# Patient Record
(demographics unavailable — no encounter records)

---

## 2024-11-04 NOTE — DISCUSSION/SUMMARY
[Medication Risks Reviewed] : Medication risks reviewed [de-identified] : Her pain could be related to an acute compression fracture that is not fully evident at this point or it could be related to aggravation of her chronic back symptoms related to her multilevel degenerative changes.  I discussed with the patient and her daughter that we would normally treat these symptoms with nonsteroidal anti-inflammatory medications but they are contraindicated in light of her compromised renal function.  They could also lead to fluid retention which could aggravate her history of congestive heart failure.  She will rest and use moist heat and push Tylenol to the full dose.  I will see her for follow-up in 3 weeks.

## 2024-11-04 NOTE — HISTORY OF PRESENT ILLNESS
[de-identified] : This 69-year-old woman is seen in the office today along with her daughter who serves as an .  She has a history of chronic intermittent back symptoms.  10 days ago she fell in her kitchen.  She does not know how she fell or landed.  Since then she has had midline lumbosacral junction lower back pain.  She has not had associated leg pain or neurologic symptoms in the lower extremities.  Her chronic constipation has increased.  Her past medical history is positive for compromised renal function.  Creatinine recently was as high as 1.52 and is now 1.30.  She also has a history of congestive heart failure.  She has had a markedly elevated hemoglobin A1c which is now 6.9.  She has been taking oxycodone for the pain. [Pain Location] : pain [8] : a maximum pain level of 8/10

## 2024-11-04 NOTE — PHYSICAL EXAM
[de-identified] : She seems fully alert and oriented with a normal mood and affect.  She has pain transferring from a chair to the examining table.  She ambulates with a reasonably normal gait.  Her lower extremity neurological exam is normal and straight leg raising is negative to 90 degrees bilaterally. [de-identified] : AP and lateral x-rays of the lumbar spine are obtained.  She has multilevel degenerative changes.  Several lumbar vertebra are slightly wedged so there is a possibility of an acute fracture.  There are no destructive changes.

## 2024-11-04 NOTE — REVIEW OF SYSTEMS
[Constipation] : constipation [Negative] : Respiratory [FreeTextEntry5] : History of congestive heart failure [FreeTextEntry8] : Compromised renal function

## 2024-11-25 NOTE — DISCUSSION/SUMMARY
[Medication Risks Reviewed] : Medication risks reviewed [de-identified] : I discussed with the patient through her daughter that even though there is no evidence of any osteoporosis in the lumbar spine with a fall from a standing height and a compression fracture she obviously has osteoporosis.  She needs to be treated for such.  She is already under the care of of an endocrinologist for thyroid problems.  If he does not want to treated she has been given the name of an orthopedic surgeon who specializes in the management of osteoporosis.  I will see her for follow-up in 2 months.

## 2024-11-25 NOTE — REASON FOR VISIT
[Follow-Up Visit] : a follow-up visit for [Back Pain] : back pain [Family Member] : family member [FreeTextEntry2] : Compression fracture L2

## 2024-11-25 NOTE — HISTORY OF PRESENT ILLNESS
[de-identified] : This 69-year-old woman returns for follow-up of spine related symptoms along with her daughter who serves as an .  She was seen 3 weeks ago about 10 days after a fall with the onset of back pain.  X-rays at that point revealed some mild wedging of a few lumbar vertebra with a little buckle on the superior anterior portion of L2 that was suggestive of a fracture.  The pain that was a constant 8 or 9 3 weeks ago is now on on and off 5.  She had a bone density earlier this year that was normal in the lumbar spine with some osteopenia about the hip.

## 2024-11-25 NOTE — PHYSICAL EXAM
[de-identified] : She is comfortable sitting today and straight leg raising is negative to 90 degrees. [de-identified] : I repeated x-rays of the lumbar spine and she clearly has a very mild superior endplate fracture of L2.

## 2025-01-09 NOTE — PHYSICAL EXAM
[Well Nourished] : well nourished [Well Developed] : well developed [Well-Appearing] : well-appearing [Normal Voice/Communication] : normal voice/communication [Normal Outer Ear/Nose] : the outer ears and nose were normal in appearance [Normal Oropharynx] : the oropharynx was normal [Normal TMs] : both tympanic membranes were normal [Normal Nasal Mucosa] : the nasal mucosa was normal [No JVD] : no jugular venous distention [No Lymphadenopathy] : no lymphadenopathy [Supple] : supple [Thyroid Normal, No Nodules] : the thyroid was normal and there were no nodules present [No Respiratory Distress] : no respiratory distress  [No Accessory Muscle Use] : no accessory muscle use [Clear to Auscultation] : lungs were clear to auscultation bilaterally [Normal Rate] : normal rate  [Regular Rhythm] : with a regular rhythm [Normal S1, S2] : normal S1 and S2 [No Murmur] : no murmur heard [No Carotid Bruits] : no carotid bruits [No Abdominal Bruit] : a ~M bruit was not heard ~T in the abdomen [No Varicosities] : no varicosities [No Edema] : there was no peripheral edema [Soft] : abdomen soft [Normal Bowel Sounds] : normal bowel sounds [Normal] : no posterior cervical lymphadenopathy and no anterior cervical lymphadenopathy [No CVA Tenderness] : no CVA  tenderness [No Spinal Tenderness] : no spinal tenderness [No Joint Swelling] : no joint swelling [Grossly Normal Strength/Tone] : grossly normal strength/tone [No Rash] : no rash [Coordination Grossly Intact] : coordination grossly intact [No Focal Deficits] : no focal deficits [Normal Gait] : normal gait [Speech Grossly Normal] : speech grossly normal [Memory Grossly Normal] : memory grossly normal [Alert and Oriented x3] : oriented to person, place, and time [Comprehensive Foot Exam Normal] : Right and left foot were examined and both feet are normal. No ulcers in either foot. Toes are normal and with full ROM.  Normal tactile sensation with monofilament testing throughout both feet [de-identified] : GYN [de-identified] : ventral hernia repaired patient has abdominal corset in place [FreeTextEntry1] : GYN [de-identified] : GYN [de-identified] : anxiety,depressed mood.

## 2025-01-09 NOTE — REVIEW OF SYSTEMS
[Fatigue] : fatigue [Chest Pain] : no chest pain [Palpitations] : palpitations [Leg Claudication] : no leg claudication [Lower Ext Edema] : lower extremity edema [Dyspnea on Exertion] : dyspnea on exertion [Abdominal Pain] : abdominal pain [Nausea] : no nausea [Constipation] : constipation [Diarrhea] : diarrhea [Vomiting] : no vomiting [Heartburn] : no heartburn [Melena] : no melena [Dysuria] : dysuria [Joint Pain] : joint pain [Joint Stiffness] : joint stiffness [Joint Swelling] : no joint swelling [Muscle Weakness] : muscle weakness [Muscle Pain] : muscle pain [Suicidal] : not suicidal [Insomnia] : insomnia [Anxiety] : anxiety [Depression] : no depression [Negative] : Heme/Lymph [FreeTextEntry5] : Chronic [FreeTextEntry7] : Status post abdominal hernia repair patient will require appendectomy

## 2025-01-09 NOTE — ASSESSMENT
[FreeTextEntry1] : Assessment and plan:  1.  Status post acute appendicitis patient was hospitalized due to her cardiac history and situation surgery is on hold patient was treated with full course of antibiotics subsequently discharged home will be seeing cardiology and the decision will be made exactly when surgery can partake.  2.  Coronary artery disease patient is presently on multiple medications has had multiple stents presently on Eliquis 5 mg twice a day, isosorbide mononitrate 30 mg daily, Bystolic 10 mg and prasugrel 10 mg.  These medications are presently being orchestrated by cardiology at [facility].  3.  Congestive heart failure patient presently at this visit is not in congestive heart failure she will continue diuretics as prescribed torsemide 20 mg 2 tabs twice a day.  4.  Hyperlipidemia continue low-fat low-cholesterol diet and rosuvastatin 40 mg p.o. daily.  5.  Hypertension blood pressure is well-controlled continue losartan 50 mg daily.  6.  Diabetes mellitus type 2 medications have been adjusted while hospitalized continue Jardiance 25 mg daily, pioglitazone 30 mg daily and Tresiba 16 units subcutaneously daily.  Patient is also on Mounjaro 10 mg subcutaneously weekly.  7.  Total time spent face-to-face and non-face-to-face time 70 minutes I reviewed patient's hospitalization at [facility] I reviewed patient discharge instructions from [facility] I do not have the actual discharge from [facility], medication reconciliation done at this visit and I did review all blood work that was done at [facility].  I will be obtaining discharge summary and the blood work from [facility].  Patient's daughter Zofia Tim provided most of the information and served as .

## 2025-01-09 NOTE — HISTORY OF PRESENT ILLNESS
[Family Member] : family member [FreeTextEntry1] : Reestablish self as patient. Status post hospitalization first at [facility] subsequently discharged to [facility] and discharged on December 16, 2024. Patient diagnosed with acute appendicitis subsequently due to her cardiac status surgery will be done in the near future most likely mid February presently feeling better and has completed full course of oral antibiotics.  Patient last seen by me December 2021. [de-identified] : Patient is a 69-year-old female who presents today to reestablish himself as a patient, recently hospitalized for abdominal pain on December 2 and subsequently discharged on December 9 and then subsequently discharged to [facility].  Medical history is significant for hypertension, hyperlipidemia, diabetes mellitus type 2 non-insulin-requiring previously on insulin, chronic kidney disease, coronary artery disease status post MI with multiple stents, congestive heart failure.  Patient's daughter Zofia Tim serves as  patient is Korean speaking.  Patient was admitted with abdominal pain at [facility] possible appendicitis patient with multiple underlying medical problems surgery was consulted for, cardiology was consulted for preoperative optimization patient is considered extremely high risk subsequently patient was discharged to [facility] where her primary cardiologist is.  Discharge summary from [facility] is not available to me at this time.

## 2025-01-09 NOTE — HEALTH RISK ASSESSMENT
[Fair] :  ~his/her~ mood as fair [No] : In the past 12 months have you used drugs other than those required for medical reasons? No [No falls in past year] : Patient reported no falls in the past year [Little interest or pleasure doing things] : 1) Little interest or pleasure doing things [0] : 1) Little interest or pleasure doing things: Not at all (0) [Feeling down, depressed, or hopeless] : 2) Feeling down, depressed, or hopeless [1] : 2) Feeling down, depressed, or hopeless for several days (1) [PHQ-2 Negative - No further assessment needed] : PHQ-2 Negative - No further assessment needed [Never] : Never [NO] : No [Audit-CScore] : 0 [[id number]] : 1 [Patient reported mammogram was abnormal] : Patient reported mammogram was abnormal [Patient reported bone density results were abnormal] : Patient reported bone density results were abnormal [HIV test declined] : HIV test declined [Hepatitis C test offered] : Hepatitis C test offered [Change in mental status noted] : No change in mental status noted [Language] : denies difficulty with language [Behavior] : denies difficulty with behavior [Learning/Retaining New Information] : denies difficulty learning/retaining new information [Handling Complex Tasks] : denies difficulty handling complex tasks [Reasoning] : denies difficulty with reasoning [Spatial Ability and Orientation] : denies difficulty with spatial ability and orientation [None] : None [With Family] : lives with family [# of Members in Household ___] :  household currently consist of [unfilled] member(s) [Retired] : retired [College] : College [] :  [Sexually Active] : not sexually active [Feels Safe at Home] : Feels safe at home [Fully functional (bathing, dressing, toileting, transferring, walking, feeding)] : Fully functional (bathing, dressing, toileting, transferring, walking, feeding) [Fully functional (using the telephone, shopping, preparing meals, housekeeping, doing laundry, using] : Fully functional and needs no help or supervision to perform IADLs (using the telephone, shopping, preparing meals, housekeeping, doing laundry, using transportation, managing medications and managing finances) [Reports changes in hearing] : Reports no changes in hearing [Reports changes in vision] : Reports changes in vision [Reports normal functional visual acuity (ie: able to read med bottle)] : Reports poor functional visual acuity.  [Reports changes in dental health] : Reports changes in dental health [Smoke Detector] : smoke detector [Carbon Monoxide Detector] : carbon monoxide detector [Safety elements used in home] : safety elements used in home [Seat Belt] :  uses seat belt [Sunscreen] : uses sunscreen [Travel to Developing Areas] : does not  travel to developing areas [TB Exposure] : is not being exposed to tuberculosis [Caregiver Concerns] : does not have caregiver concerns [MammogramDate] : 10/24 [MammogramComments] : BI-RADS 3 follow-up 6 months [BoneDensityDate] : 08/24 [de-identified] : Macular degeneration [de-identified] : Patient requires 3 extractions on hold for now [With Patient/Caregiver] : , with patient/caregiver [Reviewed no changes] : Reviewed, no changes [Designated Healthcare Proxy] : Designated healthcare proxy [Name: ___] : Health Care Proxy's Name: [unfilled]  [Relationship: ___] : Relationship: [unfilled] [Aggressive treatment] : aggressive treatment [I will adhere to the patient's wishes.] : I will adhere to the patient's wishes. [AdvancecareDate] : 01/25

## 2025-02-26 NOTE — HEALTH RISK ASSESSMENT
[Never (0 pts)] : Never (0 points) [No] : In the past 12 months have you used drugs other than those required for medical reasons? No [No falls in past year] : Patient reported no falls in the past year [Little interest or pleasure doing things] : 1) Little interest or pleasure doing things [0] : 1) Little interest or pleasure doing things: Not at all (0) [Feeling down, depressed, or hopeless] : 2) Feeling down, depressed, or hopeless [1] : 2) Feeling down, depressed, or hopeless for several days (1) [Audit-CScore] : 0 [[id number]] : 1 [With Patient/Caregiver] : , with patient/caregiver [Reviewed no changes] : Reviewed, no changes [Designated Healthcare Proxy] : Designated healthcare proxy [Name: ___] : Health Care Proxy's Name: [unfilled]  [Relationship: ___] : Relationship: [unfilled] [Aggressive treatment] : aggressive treatment [AdvancecareDate] : 02/25 [Never] : Never

## 2025-02-26 NOTE — HISTORY OF PRESENT ILLNESS
[FreeTextEntry1] : Follow-up and disease management.  Patient will be having laparoscopic appendectomy March 14, 2025 at [facility].  Patient has been cleared by her cardiologist up until now the appendectomy has been kept on hold and patient instructions were given to her by her cardiologist. [de-identified] :  Medical history is significant for hypertension, hyperlipidemia, diabetes mellitus type 2 non-insulin-requiring previously on insulin, chronic kidney disease, coronary artery disease status post MI with multiple stents, congestive heart failure.  Patient's daughter Zofia Tim serves as  patient is Scottish speaking.  Patient was admitted with abdominal pain at [facility] possible appendicitis patient with multiple underlying medical problems surgery was consulted for, cardiology was consulted for preoperative optimization patient is considered extremely high risk subsequently patient was discharged to [facility] where her primary cardiologist is.  Discharge summary from [facility] is not available to me at this time.

## 2025-02-26 NOTE — ASSESSMENT
[FreeTextEntry1] : Assessment and plan:  1.  Status post acute appendicitis surgery has been on hold patient recently seen by cardiology subsequently is cleared for laparoscopic appendectomy which will be done on March 14, 2025 at [facility].  Patient's Eliquis will be on hold and so we will Mounjaro.  2.  Coronary artery disease patient is presently on multiple medications has had multiple stents presently on Eliquis 5 mg twice a day, isosorbide mononitrate 30 mg daily, Bystolic 10 mg and prasugrel 10 mg.  These medications are presently being orchestrated by cardiology at [facility].  3.  Congestive heart failure patient presently at this visit is not in congestive heart failure she will continue diuretics as prescribed torsemide 20 mg 2 tabs twice a day.  4.  Hyperlipidemia continue low-fat low-cholesterol diet and rosuvastatin 40 mg p.o. daily.  5.  Hypertension blood pressure is well-controlled continue losartan 50 mg daily.  6.  Diabetes mellitus type 2 medications have been adjusted while hospitalized continue Jardiance 25 mg daily, pioglitazone 30 mg daily and Tresiba 16 units subcutaneously daily.  Patient is also on Mounjaro 10 mg subcutaneously weekly.  Mounjaro will be on hold 1 week prior to surgery.  7.  Diarrhea patient has been worked up by gastroenterology stool was worked up it is noninfectious most likely secondary to antibiotic use while hospitalized my recommendations are since the stool is negative Imodium 2 tabs after the first loose bowel movement of the day and 1 tab after each loose bowel movement maximum 8 tabs and they also recommend adding a probiotic.  8.  Total time spent face-to-face and non-face-to-face time 45 minutes the majority of which was spent on counseling and coordination of care.

## 2025-02-26 NOTE — COUNSELING
[Fall prevention counseling provided] : Fall prevention counseling provided [Adequate lighting] : Adequate lighting [No throw rugs] : No throw rugs [Use proper foot wear] : Use proper foot wear [Behavioral health counseling provided] : Behavioral health counseling provided [Sleep ___ hours/day] : Sleep [unfilled] hours/day [Engage in a relaxing activity] : Engage in a relaxing activity [Plan in advance] : Plan in advance [AUDIT-C Screening administered and reviewed] : AUDIT-C Screening administered and reviewed [Potential consequences of obesity discussed] : Potential consequences of obesity discussed [Benefits of weight loss discussed] : Benefits of weight loss discussed [Structured Weight Management Program suggested:] : Structured weight management program suggested [Encouraged to maintain food diary] : Encouraged to maintain food diary [Encouraged to increase physical activity] : Encouraged to increase physical activity [Encouraged to use exercise tracking device] : Encouraged to use exercise tracking device [Target Wt Loss Goal ___] : Weight Loss Goals: Target weight loss goal [unfilled] lbs [Weigh Self Weekly] : weigh self weekly [Decrease Portions] : decrease portions [____ min/wk Activity] : [unfilled] min/wk activity [Keep Food Diary] : keep food diary [FreeTextEntry1] : laverne [None] : None [Good understanding] : Patient has a good understanding of lifestyle changes and steps needed to achieve self management goal

## 2025-02-26 NOTE — PHYSICAL EXAM
[Well Nourished] : well nourished [Well Developed] : well developed [Well-Appearing] : well-appearing [Normal Voice/Communication] : normal voice/communication [Normal Outer Ear/Nose] : the outer ears and nose were normal in appearance [Normal Oropharynx] : the oropharynx was normal [Normal TMs] : both tympanic membranes were normal [Normal Nasal Mucosa] : the nasal mucosa was normal [No JVD] : no jugular venous distention [No Lymphadenopathy] : no lymphadenopathy [Supple] : supple [Thyroid Normal, No Nodules] : the thyroid was normal and there were no nodules present [No Respiratory Distress] : no respiratory distress  [No Accessory Muscle Use] : no accessory muscle use [Clear to Auscultation] : lungs were clear to auscultation bilaterally [Normal Rate] : normal rate  [Regular Rhythm] : with a regular rhythm [Normal S1, S2] : normal S1 and S2 [No Murmur] : no murmur heard [No Carotid Bruits] : no carotid bruits [No Abdominal Bruit] : a ~M bruit was not heard ~T in the abdomen [No Varicosities] : no varicosities [No Edema] : there was no peripheral edema [Soft] : abdomen soft [Normal Bowel Sounds] : normal bowel sounds [Normal] : no posterior cervical lymphadenopathy and no anterior cervical lymphadenopathy [No CVA Tenderness] : no CVA  tenderness [No Spinal Tenderness] : no spinal tenderness [No Joint Swelling] : no joint swelling [Grossly Normal Strength/Tone] : grossly normal strength/tone [No Rash] : no rash [Coordination Grossly Intact] : coordination grossly intact [No Focal Deficits] : no focal deficits [Normal Gait] : normal gait [Speech Grossly Normal] : speech grossly normal [Memory Grossly Normal] : memory grossly normal [Alert and Oriented x3] : oriented to person, place, and time [Comprehensive Foot Exam Normal] : Right and left foot were examined and both feet are normal. No ulcers in either foot. Toes are normal and with full ROM.  Normal tactile sensation with monofilament testing throughout both feet [de-identified] : GYN [de-identified] : ventral hernia repaired patient has abdominal corset in place [FreeTextEntry1] : GYN [de-identified] : GYN [de-identified] : anxiety,depressed mood.

## 2025-03-20 NOTE — HISTORY OF PRESENT ILLNESS
[FreeTextEntry1] : 2021` [de-identified] : FINDINGS: LOWER CHEST: Within normal limits.  LIVER: Within normal limits. BILE DUCTS: Normal caliber. GALLBLADDER: Cholelithiasis. SPLEEN: Within normal limits. PANCREAS: Within normal limits. ADRENALS: Within normal limits. KIDNEYS/URETERS: No hydronephrosis. Subcentimeter hypodense left renal lesion, too small to characterize. Tiny bilateral angiomyolipomas.  BLADDER: Within normal limits. REPRODUCTIVE ORGANS: Uterus and adnexa within normal limits.  BOWEL: Fat-containing lesion in the proximal sigmoid colon, measuring 3.1 x 2.5 cm. Jenny. Is normal. Colonic diverticulosis. Small hiatal hernia. No bowel obstruction. PERITONEUM: No ascites. VESSELS: Atherosclerotic changes. RETROPERITONEUM/LYMPH NODES: No lymphadenopathy. ABDOMINAL WALL: Within normal limits. BONES: Degenerative changes.  IMPRESSION: Fat-containing lesion in the proximal sigmoid colon, measuring 3.1 cm, probably lipoma.  --- End of Report ---      NICK HUNT MD; Attending Radiologist This document has been electronically signed. Apr 30 2024  1:23PM  Ordered by: LATHA STEINER       Collected/Examined: 22Apr2024 01:43PM       Verified by: LATHA STEINER 30Apr2024 02:07PM       Result Communication: No patient communication needed at this time; Stage: Final       Performed at: [facility] [place]       Resulted: 30Apr2024 01:15PM       Last Updated: 30Apr2024 02:07PM       Accession: [id number]       Results Hx:

## 2025-03-20 NOTE — ASSESSMENT
[FreeTextEntry1] : Long standing history of constipation and abdominal pain exacerbated post major abdominal surgery in 2018 with ? Crohn's but negative extensive work up by prior GI now on PPI with occasional breakthrough.  Plan:  MOM regularly Obtain prior records Continue PPI.

## 2025-06-12 NOTE — COUNSELING
[Fall prevention counseling provided] : Fall prevention counseling provided [Adequate lighting] : Adequate lighting [No throw rugs] : No throw rugs [Use proper foot wear] : Use proper foot wear [Behavioral health counseling provided] : Behavioral health counseling provided [Sleep ___ hours/day] : Sleep [unfilled] hours/day [Engage in a relaxing activity] : Engage in a relaxing activity [Plan in advance] : Plan in advance [Potential consequences of obesity discussed] : Potential consequences of obesity discussed [Benefits of weight loss discussed] : Benefits of weight loss discussed [Structured Weight Management Program suggested:] : Structured weight management program suggested [Encouraged to maintain food diary] : Encouraged to maintain food diary [Encouraged to increase physical activity] : Encouraged to increase physical activity [Encouraged to use exercise tracking device] : Encouraged to use exercise tracking device [Target Wt Loss Goal ___] : Weight Loss Goals: Target weight loss goal [unfilled] lbs [Weigh Self Weekly] : weigh self weekly [Decrease Portions] : decrease portions [____ min/wk Activity] : [unfilled] min/wk activity [Keep Food Diary] : keep food diary [Patient motivation] : Patient motivation [Good understanding] : Patient has a good understanding of lifestyle changes and steps needed to achieve self management goal

## 2025-06-12 NOTE — REVIEW OF SYSTEMS
[Fatigue] : fatigue [Palpitations] : palpitations [Lower Ext Edema] : lower extremity edema [Dyspnea on Exertion] : dyspnea on exertion [Constipation] : constipation [Dysuria] : dysuria [Joint Pain] : joint pain [Joint Stiffness] : joint stiffness [Muscle Weakness] : muscle weakness [Muscle Pain] : muscle pain [Insomnia] : insomnia [Anxiety] : anxiety [Negative] : Heme/Lymph [Chest Pain] : no chest pain [Leg Claudication] : no leg claudication [Abdominal Pain] : no abdominal pain [Nausea] : no nausea [Diarrhea] : diarrhea [Vomiting] : no vomiting [Heartburn] : no heartburn [Melena] : no melena [Joint Swelling] : no joint swelling [Suicidal] : not suicidal [Depression] : no depression [FreeTextEntry5] : Chronic [FreeTextEntry7] : Status post abdominal hernia repair patient did have appendectomy in March and feeling much better

## 2025-06-12 NOTE — PHYSICAL EXAM
[Well Nourished] : well nourished [Well Developed] : well developed [Well-Appearing] : well-appearing [Normal Voice/Communication] : normal voice/communication [Normal Outer Ear/Nose] : the outer ears and nose were normal in appearance [Normal Oropharynx] : the oropharynx was normal [Normal TMs] : both tympanic membranes were normal [Normal Nasal Mucosa] : the nasal mucosa was normal [No JVD] : no jugular venous distention [No Lymphadenopathy] : no lymphadenopathy [Supple] : supple [Thyroid Normal, No Nodules] : the thyroid was normal and there were no nodules present [No Respiratory Distress] : no respiratory distress  [No Accessory Muscle Use] : no accessory muscle use [Clear to Auscultation] : lungs were clear to auscultation bilaterally [Normal Rate] : normal rate  [Regular Rhythm] : with a regular rhythm [Normal S1, S2] : normal S1 and S2 [No Murmur] : no murmur heard [No Carotid Bruits] : no carotid bruits [No Abdominal Bruit] : a ~M bruit was not heard ~T in the abdomen [No Varicosities] : no varicosities [No Edema] : there was no peripheral edema [Soft] : abdomen soft [Normal Bowel Sounds] : normal bowel sounds [Normal] : no posterior cervical lymphadenopathy and no anterior cervical lymphadenopathy [No CVA Tenderness] : no CVA  tenderness [No Spinal Tenderness] : no spinal tenderness [No Joint Swelling] : no joint swelling [Grossly Normal Strength/Tone] : grossly normal strength/tone [No Rash] : no rash [Coordination Grossly Intact] : coordination grossly intact [No Focal Deficits] : no focal deficits [Normal Gait] : normal gait [Speech Grossly Normal] : speech grossly normal [Memory Grossly Normal] : memory grossly normal [Alert and Oriented x3] : oriented to person, place, and time [Comprehensive Foot Exam Normal] : Right and left foot were examined and both feet are normal. No ulcers in either foot. Toes are normal and with full ROM.  Normal tactile sensation with monofilament testing throughout both feet [de-identified] : GYN [de-identified] : ventral hernia repaired patient has abdominal corset in place [FreeTextEntry1] : GYN [de-identified] : GYN [de-identified] : anxiety,depressed mood.

## 2025-06-12 NOTE — HEALTH RISK ASSESSMENT
[Never (0 pts)] : Never (0 points) [No] : In the past 12 months have you used drugs other than those required for medical reasons? No [No falls in past year] : Patient reported no falls in the past year [Little interest or pleasure doing things] : 1) Little interest or pleasure doing things [Feeling down, depressed, or hopeless] : 2) Feeling down, depressed, or hopeless [With Patient/Caregiver] : , with patient/caregiver [Reviewed no changes] : Reviewed, no changes [Designated Healthcare Proxy] : Designated healthcare proxy [Name: ___] : Health Care Proxy's Name: [unfilled]  [Relationship: ___] : Relationship: [unfilled] [Aggressive treatment] : aggressive treatment [I will adhere to the patient's wishes.] : I will adhere to the patient's wishes. [Never] : Never [2] : 2) Feeling down, depressed, or hopeless for more than half of the days (2) [PHQ-2 Positive] : PHQ-2 Positive [1/2 of Days or More (2)] : 2.) Feeling down, depressed or hopeless? Half the days or more [Nearly Every Day (3)] : 4.) Feeling tired or having little energy? Nearly every day [Not at All (0)] : 9.) Thoughts that you would be off dead or of hurting yourself in some way? Not at all [Moderate] : Severity of Depression is Moderate [Somewhat Difficult] : How difficult have these problems made it for you to do your work, take care of things at home, or get along with people? Somewhat difficult [Audit-CScore] : 0 [LAG4Cshbq] : 4 [[id number]] : 10 [AdvancecareDate] : 06/25

## 2025-06-12 NOTE — HISTORY OF PRESENT ILLNESS
[Family Member] : family member [FreeTextEntry1] : Follow-up and disease management. [de-identified] :  Medical history is significant for hypertension, hyperlipidemia, diabetes mellitus type 2 non-insulin-requiring previously on insulin, chronic kidney disease, coronary artery disease status post MI with multiple stents, congestive heart failure.  Patient's daughter Zofia Tim serves as  patient is Finnish speaking.

## 2025-06-12 NOTE — ASSESSMENT
[FreeTextEntry1] : Assessment and plan:  1.  Status appendectomy which was done this past March patient feeling much better in that respect.   2.  Coronary artery disease patient is presently on multiple medications has had multiple stents presently on Eliquis 5 mg twice a day, isosorbide mononitrate 30 mg daily, Bystolic 10 mg and prasugrel 10 mg.  These medications are presently being orchestrated by cardiology at [facility].  3.  Congestive heart failure patient presently at this visit is not in congestive heart failure she will continue diuretics as prescribed torsemide 20 mg 2 tabs twice a day.  4.  Hyperlipidemia continue low-fat low-cholesterol diet and rosuvastatin 40 mg p.o. daily.  5.  Hypertension blood pressure is well-controlled continue losartan 50 mg daily.  6.  Diabetes mellitus type 2 medications have been adjusted while hospitalized continue Jardiance 25 mg daily, pioglitazone 30 mg daily and Tresiba 16 units subcutaneously daily.  Patient is also on Mounjaro 10 mg subcutaneously weekly.  Insulin has been discontinued at today's visit I will be taking comprehensive blood work and I will be checking hemoglobin A1c.  7.  Diarrhea patient has been worked up by gastroenterology and diarrhea has resolved.  Right . 8.  Total time spent face-to-face and non-face-to-face time 45 minutes the majority of which was spent on counseling and coordination of care.  Chart review, medication reconciliation done and review of subspecialist gastroenterology